# Patient Record
Sex: FEMALE | Race: WHITE | URBAN - METROPOLITAN AREA
[De-identification: names, ages, dates, MRNs, and addresses within clinical notes are randomized per-mention and may not be internally consistent; named-entity substitution may affect disease eponyms.]

---

## 2017-04-25 ENCOUNTER — HISTORICAL (OUTPATIENT)
Dept: ADMINISTRATIVE | Facility: HOSPITAL | Age: 22
End: 2017-04-25

## 2018-06-14 ENCOUNTER — HISTORICAL (OUTPATIENT)
Dept: ADMINISTRATIVE | Facility: HOSPITAL | Age: 23
End: 2018-06-14

## 2022-04-10 ENCOUNTER — HISTORICAL (OUTPATIENT)
Dept: ADMINISTRATIVE | Facility: HOSPITAL | Age: 27
End: 2022-04-10

## 2022-04-26 VITALS
WEIGHT: 145 LBS | BODY MASS INDEX: 22.76 KG/M2 | SYSTOLIC BLOOD PRESSURE: 126 MMHG | DIASTOLIC BLOOD PRESSURE: 86 MMHG | HEIGHT: 67 IN

## 2022-05-02 NOTE — HISTORICAL [facility] CERNER
This is a historical note converted from [employer]. Formatting and pictures may have been removed.  Please reference [employer] for original formatting and attached multimedia. Chief Complaint  left knee pain, no injury. pain for about 3 weeks. no treatment. c/o pain  History of Present Illness  Patient comes in today for her first visit. ?Patient complains of left knee pain for the last 3 weeks. ?She denies any specific trauma or recent injury. ?She has increasing pain while going to the gym with elliptical exercises?and walking with an incline on the treadmill.? She states the pain is in the front part of her knee, gradually goes away. ?She describes it as a burning type pain underneath her kneecap. ?She denies any catching or clicking or locking. ?She denies any specific swelling.? He is tried some rest medication without relief.  Physical Exam  Vitals & Measurements  HR:?54(Peripheral)? BP:?126/86?  HT:?170?cm? HT:?170?cm? WT:?65.77?kg? WT:?65.77?kg? BMI:?22.76?  Patient is one is well-developed female she is awake alert and oriented ?3 she is in no apparent distress she is pleasant and cooperative. ?Examination left lower extremity warm soft and warm. ?Skin is intact with no signs or symptoms of DVT or infection. ?Examination left knee there is no effusion or swelling. ?She is tender about the?patella, underneath area. ?She does have a mildly positive patella grind negative apprehension negative J sign.? She does have pain with?flexion of her knee greater than 120?.? He is stable to stressing. ?Negative anterior posterior drawer negative Lachmans negative McMurrays.? She walks with a normal gait today in clinic. ?She is neurovascular intact distally. ?X-rays 3 views left knee?image straight obvious fracture or dislocation.  Assessment/Plan  1.?Chondromalacia, left knee  ? At this time we discussed her physical exam and x-ray findings. ?Patient likely has impingement of her?Hoffa fat pad. ?She also has some mild  chondromalacia. ?We have discussed refraining from deep flexion working on quad strengthening?and refraining from any incline?with ambulation.? We will start with this first.? If she does not improve we have discussed an MRI.? I would like to see her back in 4 weeks to see how she is progressing.  Ordered:  meloxicam, 15 mg = 1 tab(s), Oral, Daily, # 30 tab(s), 0 Refill(s), Pharmacy: [facility] [id number]  Clinic Follow up, *Est. 07/12/18 3:00:00 CDT, Order for future visit, Chondromalacia, left knee  Impingement syndrome involving patellar fat pad, [facility]  Office/Outpatient Visit Level 3 [id number], Chondromalacia, left knee  Impingement syndrome involving patellar fat pad, [facility], 06/14/18 10:00:00 CDT  ?  2.?Impingement syndrome involving patellar fat pad  Ordered:  meloxicam, 15 mg = 1 tab(s), Oral, Daily, # 30 tab(s), 0 Refill(s), Pharmacy: [facility] [id number]  Clinic Follow up, *Est. 07/12/18 3:00:00 CDT, Order for future visit, Chondromalacia, left knee  Impingement syndrome involving patellar fat pad, [facility]  Office/Outpatient Visit Level 3 [id number] [facility], Chondromalacia, left knee  Impingement syndrome involving patellar fat pad, [facility], 06/14/18 10:00:00 CDT  ?  Left knee pain  ?   Problem List/Past Medical History  Ongoing  Bunion of right foot  Historical  No qualifying data  Procedure/Surgical History  EYE SURGERY.  Medications  Mobic 15 mg oral tablet, 15 mg= 1 tab(s), Oral, Daily  Allergies  Rocephin?(UNKNOWN)  Social History  Alcohol  Current, Beer, Wine, Liquor, 1-2 times per month, 04/25/2017  Employment/School  Student, 04/25/2017  Substance Abuse  Never, 04/25/2017  Tobacco  Never smoker, 04/25/2017  Health Maintenance  Health Maintenance  ???Pending?(in the next year)  ??? ??Due?  ??? ? ? ?Alcohol Misuse Screening due??06/15/18??and every 1??year(s)  ??? ? ? ?Depression Screening due??06/15/18??and every 1??year(s)  ??? ? ?  ?Tetanus Vaccine due??06/15/18??and every 10??year(s)  ??? ??Due In Future?  ??? ? ? ?Cervical Cancer Screening not due until??09/25/18??and every 1??year(s)  ??? ? ? ?Influenza Vaccine not due until??10/02/18??and every 1??year(s)  ??? ? ? ?Blood Pressure Screening not due until??06/14/19??and every 1??year(s)  ??? ? ? ?Body Mass Index Check not due until??06/14/19??and every 1??year(s)  ??? ? ? ?Obesity Screening not due until??06/14/19??and every 1??year(s)  ???Satisfied?(in the past 1 year)  ??? ??Satisfied?  ??? ? ? ?Blood Pressure Screening on??06/14/18.??Satisfied by Shae Reeder LPN  ??? ? ? ?Body Mass Index Check on??06/14/18.??Satisfied by Shae Reeder LPN  ??? ? ? ?Cervical Cancer Screening on??09/25/17.??Satisfied by Marianne Atkins  ??? ? ? ?Obesity Screening on??06/14/18.??Satisfied by Shae Reeder LPN  ?  ?